# Patient Record
Sex: MALE | Race: WHITE | URBAN - NONMETROPOLITAN AREA
[De-identification: names, ages, dates, MRNs, and addresses within clinical notes are randomized per-mention and may not be internally consistent; named-entity substitution may affect disease eponyms.]

---

## 2020-11-05 ENCOUNTER — WEB ENCOUNTER (OUTPATIENT)
Dept: URBAN - NONMETROPOLITAN AREA CLINIC 2 | Facility: CLINIC | Age: 30
End: 2020-11-05

## 2020-11-05 ENCOUNTER — OFFICE VISIT (OUTPATIENT)
Dept: URBAN - NONMETROPOLITAN AREA CLINIC 2 | Facility: CLINIC | Age: 30
End: 2020-11-05
Payer: COMMERCIAL

## 2020-11-05 DIAGNOSIS — K63.9 IRREGULAR BOWEL HABITS: ICD-10-CM

## 2020-11-05 DIAGNOSIS — K92.1 RECTAL BLEEDING: ICD-10-CM

## 2020-11-05 DIAGNOSIS — R19.4 CHANGE IN BOWEL HABITS: ICD-10-CM

## 2020-11-05 PROCEDURE — G8427 DOCREV CUR MEDS BY ELIG CLIN: HCPCS | Performed by: INTERNAL MEDICINE

## 2020-11-05 PROCEDURE — 99203 OFFICE O/P NEW LOW 30 MIN: CPT | Performed by: INTERNAL MEDICINE

## 2020-11-05 PROCEDURE — G8484 FLU IMMUNIZE NO ADMIN: HCPCS | Performed by: INTERNAL MEDICINE

## 2020-11-05 PROCEDURE — G8938 BMI DOC ONL FUP NT DOC: HCPCS | Performed by: INTERNAL MEDICINE

## 2020-11-05 PROCEDURE — G9903 PT SCRN TBCO ID AS NON USER: HCPCS | Performed by: INTERNAL MEDICINE

## 2020-11-05 PROCEDURE — 1036F TOBACCO NON-USER: CPT | Performed by: INTERNAL MEDICINE

## 2020-11-05 RX ORDER — PSYLLIUM SEED
AS DIRECTED PACKET (EA) ORAL
Status: ACTIVE | COMMUNITY

## 2020-11-05 NOTE — HPI-OTHER HISTORIES
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 wife pursuing a PHD in the same area at [employer]. he is from [place]. they hope to finish and move the her naitve [place]. has a baseline of 1-2 loose bms gen in the am for at least 6-7 yrs. is a vegetarian and has attributed the loose bms to a high fiber diet. has prob a degree of lactose intol.  probs for the past 1-2 mos. gen w still a qd bm...but,looser/watery. not up at night. no dramatic wt gain. had a telemed ov w his MD in [place]. was told to start a probiotic and metamucil. also dec his lactose and caffeine. recently has seen some bld per rectum and is very concerned. has noticed a "tightness" in his R upper back/abd.

## 2020-11-10 LAB
ADDITIONAL INFORMATION:: (no result)
COMMENT:: (no result)
DEAMIDATED GLIADIN ABS, IGA: 4
DEAMIDATED GLIADIN ABS, IGG: 3
DQ2 (DQA1 0501/0505,DQB1 02XX): NEGATIVE
DQ8 (DQA1 03XX, DQB1 0302): POSITIVE
ENDOMYSIAL ANTIBODY IGA: NEGATIVE
IMMUNOGLOBULIN A, QN, SERUM: 299
T-TRANSGLUTAMINASE (TTG) IGA: <2
T-TRANSGLUTAMINASE (TTG) IGG: 3

## 2020-11-15 ENCOUNTER — WEB ENCOUNTER (OUTPATIENT)
Dept: URBAN - NONMETROPOLITAN AREA CLINIC 2 | Facility: CLINIC | Age: 30
End: 2020-11-15

## 2020-11-18 ENCOUNTER — WEB ENCOUNTER (OUTPATIENT)
Dept: URBAN - NONMETROPOLITAN AREA CLINIC 2 | Facility: CLINIC | Age: 30
End: 2020-11-18

## 2020-12-04 ENCOUNTER — OFFICE VISIT (OUTPATIENT)
Dept: URBAN - NONMETROPOLITAN AREA SURGERY CENTER 1 | Facility: SURGERY CENTER | Age: 30
End: 2020-12-04
Payer: COMMERCIAL

## 2020-12-04 DIAGNOSIS — K21.00 ALKALINE REFLUX ESOPHAGITIS: ICD-10-CM

## 2020-12-04 DIAGNOSIS — R19.4 ALTERED BOWEL HABITS: ICD-10-CM

## 2020-12-04 PROCEDURE — 43239 EGD BIOPSY SINGLE/MULTIPLE: CPT | Performed by: INTERNAL MEDICINE

## 2020-12-04 PROCEDURE — G9937 DIG OR SURV COLSCO: HCPCS | Performed by: INTERNAL MEDICINE

## 2020-12-04 PROCEDURE — G8907 PT DOC NO EVENTS ON DISCHARG: HCPCS | Performed by: INTERNAL MEDICINE

## 2020-12-04 PROCEDURE — 45380 COLONOSCOPY AND BIOPSY: CPT | Performed by: INTERNAL MEDICINE

## 2020-12-04 RX ORDER — PSYLLIUM SEED
AS DIRECTED PACKET (EA) ORAL
Status: ACTIVE | COMMUNITY

## 2020-12-17 ENCOUNTER — OFFICE VISIT (OUTPATIENT)
Dept: URBAN - NONMETROPOLITAN AREA CLINIC 2 | Facility: CLINIC | Age: 30
End: 2020-12-17
Payer: COMMERCIAL

## 2020-12-17 ENCOUNTER — TELEPHONE ENCOUNTER (OUTPATIENT)
Dept: URBAN - NONMETROPOLITAN AREA CLINIC 2 | Facility: CLINIC | Age: 30
End: 2020-12-17

## 2020-12-17 DIAGNOSIS — Z87.19 HISTORY OF RECTAL BLEEDING: ICD-10-CM

## 2020-12-17 DIAGNOSIS — R89.9 ABNORMAL LABORATORY TEST RESULT: ICD-10-CM

## 2020-12-17 DIAGNOSIS — K90.0 GLUTEN INTOLERANCE: ICD-10-CM

## 2020-12-17 DIAGNOSIS — K21.9 GERD (GASTROESOPHAGEAL REFLUX DISEASE): ICD-10-CM

## 2020-12-17 DIAGNOSIS — R19.8 ABDOMINAL TIGHTNESS: ICD-10-CM

## 2020-12-17 DIAGNOSIS — M54.9 BACK PAIN: ICD-10-CM

## 2020-12-17 DIAGNOSIS — K63.9 IRREGULAR BOWEL HABITS: ICD-10-CM

## 2020-12-17 PROCEDURE — G8427 DOCREV CUR MEDS BY ELIG CLIN: HCPCS | Performed by: INTERNAL MEDICINE

## 2020-12-17 PROCEDURE — G8484 FLU IMMUNIZE NO ADMIN: HCPCS | Performed by: INTERNAL MEDICINE

## 2020-12-17 PROCEDURE — 1036F TOBACCO NON-USER: CPT | Performed by: INTERNAL MEDICINE

## 2020-12-17 PROCEDURE — G8938 BMI DOC ONL FUP NT DOC: HCPCS | Performed by: INTERNAL MEDICINE

## 2020-12-17 PROCEDURE — 99214 OFFICE O/P EST MOD 30 MIN: CPT | Performed by: INTERNAL MEDICINE

## 2020-12-17 PROCEDURE — G9903 PT SCRN TBCO ID AS NON USER: HCPCS | Performed by: INTERNAL MEDICINE

## 2020-12-17 RX ORDER — PSYLLIUM SEED
AS DIRECTED PACKET (EA) ORAL
Status: ACTIVE | COMMUNITY

## 2020-12-20 ENCOUNTER — DASHBOARD ENCOUNTERS (OUTPATIENT)
Age: 30
End: 2020-12-20

## 2020-12-20 PROBLEM — 16885661000119108 HISTORY OF RECTAL BLEEDING: Status: ACTIVE | Noted: 2020-12-20

## 2020-12-20 PROBLEM — 165346000 LABORATORY TEST RESULT ABNORMAL: Status: ACTIVE | Noted: 2020-12-20

## 2020-12-20 PROBLEM — 161891005 BACK PAIN: Status: ACTIVE | Noted: 2020-12-20

## 2020-12-20 PROBLEM — 444702007 IRREGULAR BOWEL HABITS: Status: ACTIVE | Noted: 2020-11-05

## 2020-12-20 PROBLEM — 235595009 GASTROESOPHAGEAL REFLUX DISEASE: Status: ACTIVE | Noted: 2020-12-20

## 2020-12-20 PROBLEM — 396331005 CELIAC DISEASE: Status: ACTIVE | Noted: 2020-12-20

## 2021-01-14 ENCOUNTER — OFFICE VISIT (OUTPATIENT)
Dept: URBAN - NONMETROPOLITAN AREA CLINIC 2 | Facility: CLINIC | Age: 31
End: 2021-01-14